# Patient Record
Sex: MALE | Race: BLACK OR AFRICAN AMERICAN | Employment: PART TIME | URBAN - METROPOLITAN AREA
[De-identification: names, ages, dates, MRNs, and addresses within clinical notes are randomized per-mention and may not be internally consistent; named-entity substitution may affect disease eponyms.]

---

## 2017-01-02 ENCOUNTER — HOSPITAL ENCOUNTER (EMERGENCY)
Age: 57
Discharge: HOME | End: 2017-01-02
Attending: EMERGENCY MEDICINE
Payer: OTHER GOVERNMENT

## 2017-01-02 ENCOUNTER — APPOINTMENT (OUTPATIENT)
Dept: CT IMAGING | Age: 57
End: 2017-01-02
Attending: EMERGENCY MEDICINE
Payer: OTHER GOVERNMENT

## 2017-01-02 VITALS
RESPIRATION RATE: 17 BRPM | DIASTOLIC BLOOD PRESSURE: 69 MMHG | SYSTOLIC BLOOD PRESSURE: 95 MMHG | HEART RATE: 67 BPM | TEMPERATURE: 97.8 F | OXYGEN SATURATION: 100 %

## 2017-01-02 DIAGNOSIS — R10.84 ABDOMINAL PAIN, GENERALIZED: ICD-10-CM

## 2017-01-02 DIAGNOSIS — K59.00 CONSTIPATION, UNSPECIFIED CONSTIPATION TYPE: ICD-10-CM

## 2017-01-02 DIAGNOSIS — K57.92 DIVERTICULITIS OF INTESTINE WITHOUT PERFORATION OR ABSCESS WITHOUT BLEEDING, UNSPECIFIED PART OF INTESTINAL TRACT: Primary | ICD-10-CM

## 2017-01-02 LAB
ALBUMIN SERPL BCP-MCNC: 3.2 G/DL (ref 3.4–5)
ALBUMIN/GLOB SERPL: 0.8 {RATIO} (ref 0.8–1.7)
ALP SERPL-CCNC: 94 U/L (ref 45–117)
ALT SERPL-CCNC: 34 U/L (ref 16–61)
ANION GAP BLD CALC-SCNC: 6 MMOL/L (ref 3–18)
AST SERPL W P-5'-P-CCNC: 27 U/L (ref 15–37)
ATRIAL RATE: 77 BPM
BASOPHILS # BLD AUTO: 0 K/UL (ref 0–0.1)
BASOPHILS # BLD: 0 % (ref 0–2)
BILIRUB DIRECT SERPL-MCNC: <0.1 MG/DL (ref 0–0.2)
BILIRUB SERPL-MCNC: 0.3 MG/DL (ref 0.2–1)
BUN SERPL-MCNC: 12 MG/DL (ref 7–18)
BUN/CREAT SERPL: 9 (ref 12–20)
CALCIUM SERPL-MCNC: 8.5 MG/DL (ref 8.5–10.1)
CALCULATED P AXIS, ECG09: 36 DEGREES
CALCULATED R AXIS, ECG10: 7 DEGREES
CALCULATED T AXIS, ECG11: 26 DEGREES
CHLORIDE SERPL-SCNC: 108 MMOL/L (ref 100–108)
CO2 SERPL-SCNC: 26 MMOL/L (ref 21–32)
CREAT SERPL-MCNC: 1.34 MG/DL (ref 0.6–1.3)
DIAGNOSIS, 93000: NORMAL
DIFFERENTIAL METHOD BLD: ABNORMAL
EOSINOPHIL # BLD: 0.1 K/UL (ref 0–0.4)
EOSINOPHIL NFR BLD: 2 % (ref 0–5)
ERYTHROCYTE [DISTWIDTH] IN BLOOD BY AUTOMATED COUNT: 15 % (ref 11.6–14.5)
GLOBULIN SER CALC-MCNC: 4 G/DL (ref 2–4)
GLUCOSE SERPL-MCNC: 129 MG/DL (ref 74–99)
HCT VFR BLD AUTO: 37.6 % (ref 36–48)
HGB BLD-MCNC: 12.5 G/DL (ref 13–16)
LACTATE BLD-SCNC: 1.4 MMOL/L (ref 0.4–2)
LIPASE SERPL-CCNC: 198 U/L (ref 73–393)
LYMPHOCYTES # BLD AUTO: 29 % (ref 21–52)
LYMPHOCYTES # BLD: 2.3 K/UL (ref 0.9–3.6)
MCH RBC QN AUTO: 26.4 PG (ref 24–34)
MCHC RBC AUTO-ENTMCNC: 33.2 G/DL (ref 31–37)
MCV RBC AUTO: 79.3 FL (ref 74–97)
MONOCYTES # BLD: 0.5 K/UL (ref 0.05–1.2)
MONOCYTES NFR BLD AUTO: 7 % (ref 3–10)
NEUTS SEG # BLD: 5 K/UL (ref 1.8–8)
NEUTS SEG NFR BLD AUTO: 62 % (ref 40–73)
P-R INTERVAL, ECG05: 164 MS
PLATELET # BLD AUTO: 185 K/UL (ref 135–420)
PMV BLD AUTO: 9.6 FL (ref 9.2–11.8)
POTASSIUM SERPL-SCNC: 4.3 MMOL/L (ref 3.5–5.5)
PROT SERPL-MCNC: 7.2 G/DL (ref 6.4–8.2)
Q-T INTERVAL, ECG07: 438 MS
QRS DURATION, ECG06: 100 MS
QTC CALCULATION (BEZET), ECG08: 495 MS
RBC # BLD AUTO: 4.74 M/UL (ref 4.7–5.5)
SODIUM SERPL-SCNC: 140 MMOL/L (ref 136–145)
VENTRICULAR RATE, ECG03: 77 BPM
WBC # BLD AUTO: 8 K/UL (ref 4.6–13.2)

## 2017-01-02 PROCEDURE — 74011250636 HC RX REV CODE- 250/636: Performed by: EMERGENCY MEDICINE

## 2017-01-02 PROCEDURE — 85025 COMPLETE CBC W/AUTO DIFF WBC: CPT | Performed by: EMERGENCY MEDICINE

## 2017-01-02 PROCEDURE — 80048 BASIC METABOLIC PNL TOTAL CA: CPT | Performed by: EMERGENCY MEDICINE

## 2017-01-02 PROCEDURE — 74177 CT ABD & PELVIS W/CONTRAST: CPT

## 2017-01-02 PROCEDURE — 93005 ELECTROCARDIOGRAM TRACING: CPT

## 2017-01-02 PROCEDURE — 87040 BLOOD CULTURE FOR BACTERIA: CPT | Performed by: EMERGENCY MEDICINE

## 2017-01-02 PROCEDURE — 83690 ASSAY OF LIPASE: CPT | Performed by: EMERGENCY MEDICINE

## 2017-01-02 PROCEDURE — 74011250637 HC RX REV CODE- 250/637: Performed by: EMERGENCY MEDICINE

## 2017-01-02 PROCEDURE — 74011636320 HC RX REV CODE- 636/320: Performed by: EMERGENCY MEDICINE

## 2017-01-02 PROCEDURE — 96374 THER/PROPH/DIAG INJ IV PUSH: CPT

## 2017-01-02 PROCEDURE — 96361 HYDRATE IV INFUSION ADD-ON: CPT

## 2017-01-02 PROCEDURE — 83605 ASSAY OF LACTIC ACID: CPT

## 2017-01-02 PROCEDURE — 80076 HEPATIC FUNCTION PANEL: CPT | Performed by: EMERGENCY MEDICINE

## 2017-01-02 PROCEDURE — 99285 EMERGENCY DEPT VISIT HI MDM: CPT

## 2017-01-02 RX ORDER — CIPROFLOXACIN 500 MG/1
500 TABLET ORAL
Status: COMPLETED | OUTPATIENT
Start: 2017-01-02 | End: 2017-01-02

## 2017-01-02 RX ORDER — DICYCLOMINE HYDROCHLORIDE 20 MG/1
20 TABLET ORAL EVERY 6 HOURS
Qty: 20 TAB | Refills: 0 | Status: SHIPPED | OUTPATIENT
Start: 2017-01-02 | End: 2017-01-07

## 2017-01-02 RX ORDER — METRONIDAZOLE 500 MG/1
500 TABLET ORAL
Status: COMPLETED | OUTPATIENT
Start: 2017-01-02 | End: 2017-01-02

## 2017-01-02 RX ORDER — METRONIDAZOLE 500 MG/1
500 TABLET ORAL 4 TIMES DAILY
Qty: 30 TAB | Refills: 0 | Status: SHIPPED | OUTPATIENT
Start: 2017-01-02 | End: 2017-01-12

## 2017-01-02 RX ORDER — HYDROMORPHONE HYDROCHLORIDE 1 MG/ML
1 INJECTION, SOLUTION INTRAMUSCULAR; INTRAVENOUS; SUBCUTANEOUS ONCE
Status: DISCONTINUED | OUTPATIENT
Start: 2017-01-02 | End: 2017-01-02 | Stop reason: HOSPADM

## 2017-01-02 RX ORDER — TRAMADOL HYDROCHLORIDE 50 MG/1
50 TABLET ORAL
Qty: 20 TAB | Refills: 0 | Status: SHIPPED | OUTPATIENT
Start: 2017-01-02

## 2017-01-02 RX ORDER — CIPROFLOXACIN 500 MG/1
500 TABLET ORAL 2 TIMES DAILY
Qty: 20 TAB | Refills: 0 | Status: SHIPPED | OUTPATIENT
Start: 2017-01-02 | End: 2017-01-12

## 2017-01-02 RX ORDER — DOCUSATE SODIUM 100 MG/1
100 CAPSULE, LIQUID FILLED ORAL 2 TIMES DAILY
Qty: 60 CAP | Refills: 0 | Status: SHIPPED | OUTPATIENT
Start: 2017-01-02 | End: 2017-04-02

## 2017-01-02 RX ADMIN — CIPROFLOXACIN HYDROCHLORIDE 500 MG: 500 TABLET, FILM COATED ORAL at 08:51

## 2017-01-02 RX ADMIN — IOPAMIDOL 100 ML: 612 INJECTION, SOLUTION INTRAVENOUS at 07:30

## 2017-01-02 RX ADMIN — SODIUM CHLORIDE 1000 ML: 900 INJECTION, SOLUTION INTRAVENOUS at 05:40

## 2017-01-02 RX ADMIN — METRONIDAZOLE 500 MG: 500 TABLET ORAL at 08:51

## 2017-01-02 NOTE — ED NOTES
Per patients wife, the patient has a history of diverticulitis and has over the past week been increasingly non compliant with the diet that he is supposed to be on.

## 2017-01-02 NOTE — ED NOTES
Bedside and Verbal shift change report given to Juany Alvarado RN (oncoming nurse) by Sonny Weiss RN (offgoing nurse). Report included the following information SBAR, ED Summary and MAR.

## 2017-01-02 NOTE — ED PROVIDER NOTES
HPI Comments: 10:29 AM Marium Shepard is a 55 y.o. male with h/o diverticulitis who presents to ED via EMS complaining of abdominal pain onset 1 hour ago. Pt states the pain woke him up from his sleep. He tried to have a bowel movement, but broke out in a sweat. He says he then lied down on the floor because it was cool the touch, and then called EMS. No other concerns or symptoms at this time. PCP: Addie Goetz MD    The history is provided by the patient. Past Medical History:   Diagnosis Date    Diabetes ([facility] [address]      Borderline-no meds.  Gastrointestinal disorder     Other ill-defined conditions      Hx of Stomach ulcer?  Psychiatric disorder      Anxiety, depression    Seizures ([facility] [address]      Last episode was 2008       Past Surgical History:   Procedure Laterality Date    Hx colonoscopy           No family history on file. Social History     Social History    Marital status:      Spouse name: N/A    Number of children: N/A    Years of education: N/A     Occupational History    Not on file. Social History Main Topics    Smoking status: Current Every Day Smoker     Packs/day: 1.00    Smokeless tobacco: Not on file    Alcohol use No    Drug use: No    Sexual activity: Not on file     Other Topics Concern    Not on file     Social History Narrative    No narrative on file         ALLERGIES: Demerol [meperidine]    Review of Systems   Constitutional: Positive for diaphoresis. Negative for activity change, appetite change and fever. HENT: Negative for congestion, dental problem, ear pain, hearing loss, nosebleeds, postnasal drip, sinus pressure, sneezing and tinnitus. Eyes: Negative for photophobia, discharge, redness and visual disturbance. Respiratory: Negative for cough, choking, shortness of breath, wheezing and stridor. Cardiovascular: Negative for chest pain, palpitations and leg swelling. Gastrointestinal: Positive for abdominal pain.  Negative for abdominal distention, anal bleeding and blood in stool. Genitourinary: Negative for decreased urine volume, difficulty urinating, discharge, dysuria, frequency, hematuria, penile swelling, scrotal swelling, testicular pain and urgency. Musculoskeletal: Negative for arthralgias, back pain, gait problem, joint swelling, myalgias and neck pain. Skin: Negative for color change and pallor. Neurological: Negative for dizziness, tremors, seizures, syncope and headaches. Hematological: Negative for adenopathy. Does not bruise/bleed easily. Psychiatric/Behavioral: Negative for agitation, behavioral problems, confusion and hallucinations. The patient is not nervous/anxious. Vitals:    01/02/17 0507   BP: 98/60   Pulse: 72   Resp: 24   Temp: 97.8 °F (36.6 °C)   SpO2: 96%            Physical Exam   Constitutional: He is oriented to person, place, and time. He appears well-developed and well-nourished. Patient is stable enough to shift back and forth in his bed. HENT:   Head: Normocephalic and atraumatic. Right Ear: External ear normal.   Left Ear: External ear normal.   Nose: Nose normal.   Mouth/Throat: Oropharynx is clear and moist.   Eyes: Conjunctivae and EOM are normal. Pupils are equal, round, and reactive to light. Right eye exhibits no discharge. No scleral icterus. Neck: Normal range of motion. Neck supple. No JVD present. No thyromegaly present. Cardiovascular: Normal rate, regular rhythm and intact distal pulses. Exam reveals no gallop and no friction rub. No murmur heard. Pulmonary/Chest: No respiratory distress. He has no wheezes. He has no rales. He exhibits no tenderness. Abdominal: Bowel sounds are decreased. Few bowel sounds. Musculoskeletal: Normal range of motion. He exhibits no edema. Lymphadenopathy:     He has no cervical adenopathy. Neurological: He is alert and oriented to person, place, and time. No cranial nerve deficit.  Coordination normal.   Skin: Skin is warm and dry. No rash noted. No erythema. Psychiatric: He has a normal mood and affect. His behavior is normal. Judgment normal.   Nursing note and vitals reviewed. [facility]  ED Course       Procedures    Vitals:  Patient Vitals for the past 12 hrs:   Temp Pulse Resp BP SpO2   01/02/17 0507 97.8 °F (36.6 °C) 72 24 98/60 96 %   96% on RA, indicating adequate oxygenation. Medications ordered:   Medications - No data to display      Lab findings:  No results found for this or any previous visit (from the past 12 hour(s)). EKG interpretation by ED Physician:       X-Ray, CT or other radiology findings or impressions:  No orders to display       Orders:  Orders Placed This Encounter    EKG, 12 LEAD, INITIAL     Standing Status:   Standing     Number of Occurrences:   1     Order Specific Question:   Reason for Exam:     Answer:   ams       Progress notes, Consult notes, or additional Procedure notes:       Disposition:  Diagnosis: No diagnosis found. Disposition:     Follow-up Information     None           Patient's Medications   Start Taking    No medications on file   Continue Taking    DIVALPROEX DR (DEPAKOTE) 500 MG TABLET    Take 500 mg by mouth two (2) times a day. Indications: EPILEPSY    HYDROCODONE-ACETAMINOPHEN 5-500 MG CAP    Take 1 Tab by mouth every six (6) hours as needed. Indications: PAIN    IBUPROFEN (MOTRIN) 800 MG TABLET    Take 800 mg by mouth as needed. Indications: PAIN    OMEPRAZOLE (PRILOSEC) 20 MG CAPSULE    Take 20 mg by mouth two (2) times a day. OXYCODONE-ACETAMINOPHEN (PERCOCET 7.5) 7.5-325 MG PER TABLET    Take 1 tablet by mouth every four (4) hours as needed for Pain. OXYCODONE-ACETAMINOPHEN (PERCOCET) 5-325 MG PER TABLET    Take 1 tablet by mouth every four (4) hours as needed for Pain. ROSUVASTATIN (CRESTOR) 40 MG TABLET    Take 20 mg by mouth nightly.  NON FORMULARY   Indications: HYPERCHOLESTEROLEMIA    TERAZOSIN (HYTRIN) 2 MG CAPSULE    Take 2 mg by mouth daily.      TERBINAFINE (LAMISIL) 1 % TOPICAL CREAM    Apply  to affected area two (2) times daily as needed. TO AFFECTED AREAS   Indications: RASH    VENLAFAXINE (EFFEXOR) 75 MG TABLET    Take 75 mg by mouth daily. These Medications have changed    No medications on file   Stop Taking    No medications on file     [id number] [facility] for and in the presence of Jayson Ahn MD (01/02/17)      Physician Attestation    I personally performed the services described in this documentation, reviewed and edited the documentation which was dictated to the scribe in my presence, and it accurately records my words and actions. Jayson Anh MD (01/02/17)      Signed by: Xavi English, 01/02/17, 5:38 AM      Pt signed out to me by Dr. Wynne Dance pending CT results with anticipated discharge to home and follow up with his doctors at the [address] [place] [address]. Reviewed results with pt. No fevers or leukocytosis, no focal abdominal tenderness, no rebound or guarding. Will treat as possible diverticulitis and refer to PMD for ongoing management and GI follow up. Pt in agreement with discharge plans.  Hollie Thompson MD

## 2017-01-02 NOTE — DISCHARGE INSTRUCTIONS
Diverticulitis: Care Instructions  Your Care Instructions    Diverticulitis occurs when pouches form in the wall of the colon and become inflamed or infected. It can be very painful. Doctors aren't sure what causes diverticulitis. There is no proof that foods such as nuts, seeds, or berries cause it or make it worse. A low-fiber diet may cause the colon to work harder to push stool forward. Pouches may form because of this extra work. It may be hard to think about healthy eating while you're in pain. But as you recover, you might think about how you can use healthy eating for overall better health. Healthy eating may help you avoid future attacks. Follow-up care is a key part of your treatment and safety. Be sure to make and go to all appointments, and call your doctor if you are having problems. It's also a good idea to know your test results and keep a list of the medicines you take. How can you care for yourself at home? · Drink plenty of fluids, enough so that your urine is light yellow or clear like water. If you have kidney, heart, or liver disease and have to limit fluids, talk with your doctor before you increase the amount of fluids you drink. · Stick to liquids or a bland diet (plain rice, bananas, dry toast or crackers, applesauce) until you are feeling better. Then you can return to regular foods and gradually increase the amount of fiber in your diet. · Use a heating pad set on low on your belly to relieve mild cramps and pain. · Get extra rest until you are feeling better. · Be safe with medicines. Read and follow all instructions on the label. ¨ If the doctor gave you a prescription medicine for pain, take it as prescribed. ¨ If you are not taking a prescription pain medicine, ask your doctor if you can take an over-the-counter medicine. · If your doctor prescribed antibiotics, take them as directed. Do not stop taking them just because you feel better.  You need to take the full course of antibiotics. To prevent future attacks of diverticulitis  · Avoid constipation:  ¨ Include fruits, vegetables, beans, and whole grains in your diet each day. These foods are high in fiber. ¨ Drink plenty of fluids, enough so that your urine is light yellow or clear like water. If you have kidney, heart, or liver disease and have to limit fluids, talk with your doctor before you increase the amount of fluids you drink. ¨ Get some exercise every day. Build up slowly to 30 to 60 minutes a day on 5 or more days of the week. ¨ Take a fiber supplement, such as Citrucel or Metamucil, every day if needed. Read and follow all instructions on the label. ¨ Schedule time each day for a bowel movement. Having a daily routine may help. Take your time and do not strain when having a bowel movement. When should you call for help? Call 911 anytime you think you may need emergency care. For example, call if:  · You passed out (lost consciousness). · You vomit blood or what looks like coffee grounds. · You pass maroon or very bloody stools. Call your doctor now or seek immediate medical care if:  · You have severe pain or swelling in your belly. · You have a new or higher fever. · You cannot keep down fluids or medicines. · You have new pain that gets worse when you move or cough. Watch closely for changes in your health, and be sure to contact your doctor if:  · The symptoms you had when you first started feeling sick come back. · You do not get better as expected. Where can you learn more? Go to http://ale-trinidad.info/. Enter H901 in the search box to learn more about \"Diverticulitis: Care Instructions. \"  Current as of: August 9, 2016  Content Version: 11.1  © 1344-8966 [employer]. Care instructions adapted under license by [employer] (which disclaims liability or warranty for this information).  If you have questions about a medical condition or this instruction, always ask your healthcare professional. [place] [address] any warranty or liability for your use of this information. Abdominal Pain: Care Instructions  Your Care Instructions    Abdominal pain has many possible causes. Some aren't serious and get better on their own in a few days. Others need more testing and treatment. If your pain continues or gets worse, you need to be rechecked and may need more tests to find out what is wrong. You may need surgery to correct the problem. Don't ignore new symptoms, such as fever, nausea and vomiting, urination problems, pain that gets worse, and dizziness. These may be signs of a more serious problem. Your doctor may have recommended a follow-up visit in the next 8 to 12 hours. If you are not getting better, you may need more tests or treatment. The doctor has checked you carefully, but problems can develop later. If you notice any problems or new symptoms, get medical treatment right away. Follow-up care is a key part of your treatment and safety. Be sure to make and go to all appointments, and call your doctor if you are having problems. It's also a good idea to know your test results and keep a list of the medicines you take. How can you care for yourself at home? · Rest until you feel better. · To prevent dehydration, drink plenty of fluids, enough so that your urine is light yellow or clear like water. Choose water and other caffeine-free clear liquids until you feel better. If you have kidney, heart, or liver disease and have to limit fluids, talk with your doctor before you increase the amount of fluids you drink. · If your stomach is upset, eat mild foods, such as rice, dry toast or crackers, bananas, and applesauce. Try eating several small meals instead of two or three large ones. · Wait until 48 hours after all symptoms have gone away before you have spicy foods, alcohol, and drinks that contain caffeine.   · Do not eat foods that are high in fat. · Avoid anti-inflammatory medicines such as aspirin, ibuprofen (Advil, Motrin), and naproxen (Aleve). These can cause stomach upset. Talk to your doctor if you take daily aspirin for another health problem. When should you call for help? Call 911 anytime you think you may need emergency care. For example, call if:  · You passed out (lost consciousness). · You pass maroon or very bloody stools. · You vomit blood or what looks like coffee grounds. · You have new, severe belly pain. Call your doctor now or seek immediate medical care if:  · Your pain gets worse, especially if it becomes focused in one area of your belly. · You have a new or higher fever. · Your stools are black and look like tar, or they have streaks of blood. · You have unexpected vaginal bleeding. · You have symptoms of a urinary tract infection. These may include:  ¨ Pain when you urinate. ¨ Urinating more often than usual.  ¨ Blood in your urine. · You are dizzy or lightheaded, or you feel like you may faint. Watch closely for changes in your health, and be sure to contact your doctor if:  · You are not getting better after 1 day (24 hours). Where can you learn more? Go to http://ale-trinidad.info/. Enter E917 in the search box to learn more about \"Abdominal Pain: Care Instructions. \"  Current as of: May 27, 2016  Content Version: 11.1  © 4078-8586 [employer]. Care instructions adapted under license by [employer] (which disclaims liability or warranty for this information). If you have questions about a medical condition or this instruction, always ask your healthcare professional. [place] [address] any warranty or liability for your use of this information. Constipation: Care Instructions  Your Care Instructions  Constipation means that you have a hard time passing stools (bowel movements).  People pass stools from 3 times a day to once every 3 days. What is normal for you may be different. Constipation may occur with pain in the rectum and cramping. The pain may get worse when you try to pass stools. Sometimes there are small amounts of bright red blood on toilet paper or the surface of stools. This is because of enlarged veins near the rectum (hemorrhoids). A few changes in your diet and lifestyle may help you avoid ongoing constipation. Your doctor may also prescribe medicine to help loosen your stool. Some medicines can cause constipation. These include pain medicines and antidepressants. Tell your doctor about all the medicines you take. Your doctor may want to make a medicine change to ease your symptoms. Follow-up care is a key part of your treatment and safety. Be sure to make and go to all appointments, and call your doctor if you are having problems. It's also a good idea to know your test results and keep a list of the medicines you take. How can you care for yourself at home? · Drink plenty of fluids, enough so that your urine is light yellow or clear like water. If you have kidney, heart, or liver disease and have to limit fluids, talk with your doctor before you increase the amount of fluids you drink. · Include high-fiber foods in your diet each day. These include fruits, vegetables, beans, and whole grains. · Get at least 30 minutes of exercise on most days of the week. Walking is a good choice. You also may want to do other activities, such as running, swimming, cycling, or playing tennis or team sports. · Take a fiber supplement, such as Citrucel or Metamucil, every day. Read and follow all instructions on the label. · Schedule time each day for a bowel movement. A daily routine may help. Take your time having your bowel movement. · Support your feet with a small step stool when you sit on the toilet. This helps flex your hips and places your pelvis in a squatting position.   · Your doctor may recommend an over-the-counter laxative to relieve your constipation. Examples are Milk of Magnesia and MiraLax. Read and follow all instructions on the label. Do not use laxatives on a long-term basis. When should you call for help? Call your doctor now or seek immediate medical care if:  · You have new or worse belly pain. · You have new or worse nausea or vomiting. · You have blood in your stools. Watch closely for changes in your health, and be sure to contact your doctor if:  · Your constipation is getting worse. · You do not get better as expected. Where can you learn more? Go to http://ale-trinidad.info/. Enter 21 [id number] in the search box to learn more about \"Constipation: Care Instructions. \"  Current as of: May 27, 2016  Content Version: 11.1  © 4270-6662 [employer]. Care instructions adapted under license by [employer] (which disclaims liability or warranty for this information). If you have questions about a medical condition or this instruction, always ask your healthcare professional. [place] [address] any warranty or liability for your use of this information.

## 2017-01-02 NOTE — ED NOTES
I have reviewed discharge instructions with the patient. The patient verbalized understanding. Paper discharge instructions signed and placed in scan bin.

## 2017-01-02 NOTE — ED NOTES
Patient assisted to bedside commode. Patient reports that he is not having abdominal pain at this time and is feeling much better since he has vomited and had a bowel movement.

## 2017-01-02 NOTE — ED TRIAGE NOTES
Patient brought into the ED by EMTs complaining of abdominal pain. Patient reports that he woke up in the middle of the night and his stomach was bother him. Patient states that he got hot and sweaty and felt like he was going to get sick. Patient has had one episode of a pinkish brown color upon entering the room and reports that this is the first time he has actually gotten sick.  Patient at 92% on room air when EMTs found him and was placed on 2L NC.

## 2017-01-08 LAB
BACTERIA SPEC CULT: NORMAL
BACTERIA SPEC CULT: NORMAL
SERVICE CMNT-IMP: NORMAL
SERVICE CMNT-IMP: NORMAL

## 2022-03-23 ENCOUNTER — HOSPITAL ENCOUNTER (EMERGENCY)
Age: 62
Discharge: HOME | End: 2022-03-24
Attending: EMERGENCY MEDICINE
Payer: OTHER GOVERNMENT

## 2022-03-23 DIAGNOSIS — R42 LIGHTHEADED: ICD-10-CM

## 2022-03-23 DIAGNOSIS — R07.9 CHEST PAIN, UNSPECIFIED TYPE: Primary | ICD-10-CM

## 2022-03-23 PROCEDURE — 99285 EMERGENCY DEPT VISIT HI MDM: CPT

## 2022-03-24 ENCOUNTER — APPOINTMENT (OUTPATIENT)
Dept: GENERAL RADIOLOGY | Age: 62
End: 2022-03-24
Attending: EMERGENCY MEDICINE
Payer: OTHER GOVERNMENT

## 2022-03-24 VITALS
SYSTOLIC BLOOD PRESSURE: 99 MMHG | WEIGHT: 264 LBS | BODY MASS INDEX: 43.99 KG/M2 | HEART RATE: 66 BPM | HEIGHT: 65 IN | TEMPERATURE: 98.2 F | DIASTOLIC BLOOD PRESSURE: 69 MMHG | OXYGEN SATURATION: 96 % | RESPIRATION RATE: 21 BRPM

## 2022-03-24 LAB
ALBUMIN SERPL-MCNC: 3.2 G/DL (ref 3.4–5)
ALBUMIN/GLOB SERPL: 0.9 {RATIO} (ref 0.8–1.7)
ALP SERPL-CCNC: 84 U/L (ref 45–117)
ALT SERPL-CCNC: 34 U/L (ref 16–61)
ANION GAP SERPL CALC-SCNC: 5 MMOL/L (ref 3–18)
AST SERPL-CCNC: 22 U/L (ref 10–38)
ATRIAL RATE: 66 BPM
BASOPHILS # BLD: 0 K/UL (ref 0–0.1)
BASOPHILS NFR BLD: 0 % (ref 0–2)
BILIRUB SERPL-MCNC: 0.3 MG/DL (ref 0.2–1)
BUN SERPL-MCNC: 12 MG/DL (ref 7–18)
BUN/CREAT SERPL: 9 (ref 12–20)
CALCIUM SERPL-MCNC: 8.6 MG/DL (ref 8.5–10.1)
CALCULATED P AXIS, ECG09: 31 DEGREES
CALCULATED R AXIS, ECG10: 0 DEGREES
CALCULATED T AXIS, ECG11: 29 DEGREES
CHLORIDE SERPL-SCNC: 109 MMOL/L (ref 100–111)
CO2 SERPL-SCNC: 27 MMOL/L (ref 21–32)
CREAT SERPL-MCNC: 1.35 MG/DL (ref 0.6–1.3)
DIAGNOSIS, 93000: NORMAL
DIFFERENTIAL METHOD BLD: ABNORMAL
EOSINOPHIL # BLD: 0.1 K/UL (ref 0–0.4)
EOSINOPHIL NFR BLD: 1 % (ref 0–5)
ERYTHROCYTE [DISTWIDTH] IN BLOOD BY AUTOMATED COUNT: 16.2 % (ref 11.6–14.5)
GLOBULIN SER CALC-MCNC: 3.7 G/DL (ref 2–4)
GLUCOSE SERPL-MCNC: 100 MG/DL (ref 74–99)
HCT VFR BLD AUTO: 35.6 % (ref 36–48)
HGB BLD-MCNC: 11.3 G/DL (ref 13–16)
IMM GRANULOCYTES # BLD AUTO: 0 K/UL (ref 0–0.04)
IMM GRANULOCYTES NFR BLD AUTO: 0 % (ref 0–0.5)
LYMPHOCYTES # BLD: 1.9 K/UL (ref 0.9–3.6)
LYMPHOCYTES NFR BLD: 30 % (ref 21–52)
MCH RBC QN AUTO: 25.1 PG (ref 24–34)
MCHC RBC AUTO-ENTMCNC: 31.7 G/DL (ref 31–37)
MCV RBC AUTO: 78.9 FL (ref 78–100)
MONOCYTES # BLD: 0.4 K/UL (ref 0.05–1.2)
MONOCYTES NFR BLD: 7 % (ref 3–10)
NEUTS SEG # BLD: 3.9 K/UL (ref 1.8–8)
NEUTS SEG NFR BLD: 62 % (ref 40–73)
NRBC # BLD: 0 K/UL (ref 0–0.01)
NRBC BLD-RTO: 0 PER 100 WBC
P-R INTERVAL, ECG05: 146 MS
PLATELET # BLD AUTO: 218 K/UL (ref 135–420)
PMV BLD AUTO: 9.8 FL (ref 9.2–11.8)
POTASSIUM SERPL-SCNC: 4.7 MMOL/L (ref 3.5–5.5)
PROT SERPL-MCNC: 6.9 G/DL (ref 6.4–8.2)
Q-T INTERVAL, ECG07: 436 MS
QRS DURATION, ECG06: 88 MS
QTC CALCULATION (BEZET), ECG08: 457 MS
RBC # BLD AUTO: 4.51 M/UL (ref 4.35–5.65)
SODIUM SERPL-SCNC: 141 MMOL/L (ref 136–145)
TROPONIN-HIGH SENSITIVITY: <3 NG/L (ref 0–78)
TROPONIN-HIGH SENSITIVITY: <3 NG/L (ref 0–78)
VENTRICULAR RATE, ECG03: 66 BPM
WBC # BLD AUTO: 6.3 K/UL (ref 4.6–13.2)

## 2022-03-24 PROCEDURE — 85025 COMPLETE CBC W/AUTO DIFF WBC: CPT

## 2022-03-24 PROCEDURE — 84484 ASSAY OF TROPONIN QUANT: CPT

## 2022-03-24 PROCEDURE — 71045 X-RAY EXAM CHEST 1 VIEW: CPT

## 2022-03-24 PROCEDURE — 80053 COMPREHEN METABOLIC PANEL: CPT

## 2022-03-24 PROCEDURE — 93005 ELECTROCARDIOGRAM TRACING: CPT

## 2022-03-24 NOTE — ED TRIAGE NOTES
Patient presents to the ED via EMS from home for evaluation of chest pain. Per medic, \"Patient had been up using the restroom when he became a little light headed and his chest started hurting. He had been hypertensive initially for us. We administered 1 nitro ODT and established IV access. \"     Patient janae any difficulty breathing or vision changes.

## 2022-03-24 NOTE — ED NOTES
Patient in bed resting with lights dimmed, in stable condition and wife at bedside. Lab wok currently pending.

## 2022-03-24 NOTE — ED PROVIDER NOTES
EMERGENCY DEPARTMENT HISTORY AND PHYSICAL EXAM  This was created with voice recognition software and transcription errors may be present. 12:08 AM  Date: 3/23/2022  Patient Name: Domenic Maravilla    History of Presenting Illness     Chief Complaint:    History Provided By:     HPI: Domenic Maravilla is a 65 y.o. male past medical history of diabetes anxiety seizures MI who presents with chest tightness. Patient states he was at home earlier this evening and 3 separate times about 20 minutes each he felt chest tightness associated with diaphoresis and nausea he said he felt like this was like his last heart attack. It occurred when he walked around her set up but laying down made it better. One point he had to have a bowel movement as well. He also felt lightheaded like he might pass out. Currently pain-free    PCP: Addie Goetz MD      Past History     Past Medical History:  Past Medical History:   Diagnosis Date    Diabetes ([facility] [address]     Borderline-no meds.  Gastrointestinal disorder     Other ill-defined conditions     Hx of Stomach ulcer?  Psychiatric disorder     Anxiety, depression    Seizures ([facility] [address]     Last episode was 2008       Past Surgical History:  Past Surgical History:   Procedure Laterality Date    HX COLONOSCOPY         Family History:  No family history on file. Social History:  Social History     Tobacco Use    Smoking status: Current Every Day Smoker     Packs/day: 1.00    Smokeless tobacco: Not on file   Substance Use Topics    Alcohol use: No    Drug use: No       Allergies: Allergies   Allergen Reactions    Demerol [Meperidine] Seizures       Review of Systems     Review of Systems   Constitutional: Negative for activity change. HENT: Negative for congestion. All other systems reviewed and are negative. 10 point review of systems otherwise negative unless noted in HPI. Physical Exam       Physical Exam  Constitutional:       Appearance: He is well-developed.    HENT: Head: Normocephalic and atraumatic. Eyes:      Pupils: Pupils are equal, round, and reactive to light. Cardiovascular:      Rate and Rhythm: Normal rate and regular rhythm. Heart sounds: Normal heart sounds. No murmur heard. No friction rub. Pulmonary:      Effort: Pulmonary effort is normal. No respiratory distress. Breath sounds: Normal breath sounds. No wheezing. Abdominal:      General: There is no distension. Palpations: Abdomen is soft. Tenderness: There is no abdominal tenderness. There is no guarding or rebound. Musculoskeletal:         General: Normal range of motion. Cervical back: Normal range of motion and neck supple. Skin:     General: Skin is warm and dry. Neurological:      Mental Status: He is alert and oriented to person, place, and time. Psychiatric:         Behavior: Behavior normal.         Thought Content: Thought content normal.         Diagnostic Study Results     Vital Signs  EKG: EKG shows sinus at 66 normal axis normal intervals no ST elevation or depression no hypertrophy  Labs:   Imaging: napd     Medical Decision Making     ED Course: Progress Notes, Reevaluation, and Consults:    I will be the provider of record for this patient. Provider Notes (Medical Decision Making): Patient presents with 3 episodes of chest tightness and diaphoresis. Currently pain-free without diaphoresis. Also associated lightheadedness check troponin x2 EKG and x-ray      Troponin negative x2 patient ambulated without difficulty will discharge for outpatient follow-up    Diagnosis     Clinical Impression: No diagnosis found. Disposition:        Patient's Medications   Start Taking    No medications on file   Continue Taking    DIVALPROEX DR (DEPAKOTE) 500 MG TABLET    Take 500 mg by mouth two (2) times a day. Indications: EPILEPSY    HYDROCODONE-ACETAMINOPHEN 5-500 MG CAP    Take 1 Tab by mouth every six (6) hours as needed.     Indications: PAIN IBUPROFEN (MOTRIN) 800 MG TABLET    Take 800 mg by mouth as needed. Indications: PAIN    OMEPRAZOLE (PRILOSEC) 20 MG CAPSULE    Take 20 mg by mouth two (2) times a day. OXYCODONE-ACETAMINOPHEN (PERCOCET 7.5) 7.5-325 MG PER TABLET    Take 1 tablet by mouth every four (4) hours as needed for Pain. OXYCODONE-ACETAMINOPHEN (PERCOCET) 5-325 MG PER TABLET    Take 1 tablet by mouth every four (4) hours as needed for Pain. ROSUVASTATIN (CRESTOR) 40 MG TABLET    Take 20 mg by mouth nightly. NON FORMULARY   Indications: HYPERCHOLESTEROLEMIA    TERAZOSIN (HYTRIN) 2 MG CAPSULE    Take 2 mg by mouth daily. TERBINAFINE (LAMISIL) 1 % TOPICAL CREAM    Apply  to affected area two (2) times daily as needed. TO AFFECTED AREAS   Indications: RASH    TRAMADOL (ULTRAM) 50 MG TABLET    Take 1 Tab by mouth every six (6) hours as needed for Pain. Max Daily Amount: 200 mg. VENLAFAXINE (EFFEXOR) 75 MG TABLET    Take 75 mg by mouth daily.      These Medications have changed    No medications on file   Stop Taking    No medications on file

## 2024-10-02 ENCOUNTER — APPOINTMENT (OUTPATIENT)
Facility: HOSPITAL | Age: 64
DRG: 683 | End: 2024-10-02
Payer: OTHER GOVERNMENT

## 2024-10-02 ENCOUNTER — HOSPITAL ENCOUNTER (INPATIENT)
Facility: HOSPITAL | Age: 64
LOS: 1 days | Discharge: LEFT BEFORE DISCHARGE | DRG: 683 | End: 2024-10-02
Attending: EMERGENCY MEDICINE | Admitting: INTERNAL MEDICINE
Payer: OTHER GOVERNMENT

## 2024-10-02 ENCOUNTER — APPOINTMENT (OUTPATIENT)
Facility: HOSPITAL | Age: 64
DRG: 683 | End: 2024-10-02
Attending: INTERNAL MEDICINE
Payer: OTHER GOVERNMENT

## 2024-10-02 VITALS
HEART RATE: 79 BPM | BODY MASS INDEX: 43.39 KG/M2 | WEIGHT: 270 LBS | TEMPERATURE: 97.7 F | HEIGHT: 66 IN | SYSTOLIC BLOOD PRESSURE: 102 MMHG | DIASTOLIC BLOOD PRESSURE: 63 MMHG | OXYGEN SATURATION: 97 % | RESPIRATION RATE: 18 BRPM

## 2024-10-02 DIAGNOSIS — K57.92 DIVERTICULITIS: ICD-10-CM

## 2024-10-02 DIAGNOSIS — N17.9 ACUTE RENAL FAILURE, UNSPECIFIED ACUTE RENAL FAILURE TYPE (HCC): Primary | ICD-10-CM

## 2024-10-02 DIAGNOSIS — R07.9 ACUTE CHEST PAIN: ICD-10-CM

## 2024-10-02 DIAGNOSIS — I95.9 HYPOTENSION, UNSPECIFIED HYPOTENSION TYPE: ICD-10-CM

## 2024-10-02 LAB
ALBUMIN SERPL-MCNC: 2.9 G/DL (ref 3.4–5)
ALBUMIN/GLOB SERPL: 0.7 (ref 0.8–1.7)
ALP SERPL-CCNC: 91 U/L (ref 45–117)
ALT SERPL-CCNC: 29 U/L (ref 16–61)
ANION GAP SERPL CALC-SCNC: 9 MMOL/L (ref 3–18)
APPEARANCE UR: CLEAR
AST SERPL-CCNC: 26 U/L (ref 10–38)
BACTERIA URNS QL MICRO: ABNORMAL /HPF
BASOPHILS # BLD: 0 K/UL (ref 0–0.1)
BASOPHILS NFR BLD: 0 % (ref 0–2)
BILIRUB SERPL-MCNC: 0.4 MG/DL (ref 0.2–1)
BILIRUB UR QL: NEGATIVE
BUN SERPL-MCNC: 43 MG/DL (ref 7–18)
BUN/CREAT SERPL: 12 (ref 12–20)
CALCIUM SERPL-MCNC: 9.5 MG/DL (ref 8.5–10.1)
CHLORIDE SERPL-SCNC: 108 MMOL/L (ref 100–111)
CO2 SERPL-SCNC: 22 MMOL/L (ref 21–32)
COLOR UR: YELLOW
CREAT SERPL-MCNC: 3.57 MG/DL (ref 0.6–1.3)
CREAT UR-MCNC: 237 MG/DL (ref 30–125)
CREAT UR-MCNC: 242 MG/DL (ref 30–125)
CREAT UR-MCNC: 246 MG/DL (ref 30–125)
CRP SERPL-MCNC: 2.3 MG/DL (ref 0–0.3)
DIFFERENTIAL METHOD BLD: ABNORMAL
ECHO BSA: 2.39 M2
EKG ATRIAL RATE: 84 BPM
EKG ATRIAL RATE: 88 BPM
EKG DIAGNOSIS: NORMAL
EKG DIAGNOSIS: NORMAL
EKG P AXIS: 52 DEGREES
EKG P AXIS: 58 DEGREES
EKG P-R INTERVAL: 156 MS
EKG P-R INTERVAL: 166 MS
EKG Q-T INTERVAL: 406 MS
EKG Q-T INTERVAL: 410 MS
EKG QRS DURATION: 88 MS
EKG QRS DURATION: 94 MS
EKG QTC CALCULATION (BAZETT): 479 MS
EKG QTC CALCULATION (BAZETT): 496 MS
EKG R AXIS: 19 DEGREES
EKG R AXIS: 27 DEGREES
EKG T AXIS: 56 DEGREES
EKG T AXIS: 57 DEGREES
EKG VENTRICULAR RATE: 84 BPM
EKG VENTRICULAR RATE: 88 BPM
EOSINOPHIL # BLD: 0.2 K/UL (ref 0–0.4)
EOSINOPHIL NFR BLD: 3 % (ref 0–5)
EPITH CASTS URNS QL MICRO: ABNORMAL /LPF (ref 0–5)
ERYTHROCYTE [DISTWIDTH] IN BLOOD BY AUTOMATED COUNT: 16.7 % (ref 11.6–14.5)
ERYTHROCYTE [SEDIMENTATION RATE] IN BLOOD: 128 MM/HR (ref 0–20)
GLOBULIN SER CALC-MCNC: 4.2 G/DL (ref 2–4)
GLUCOSE SERPL-MCNC: 150 MG/DL (ref 74–99)
GLUCOSE UR STRIP.AUTO-MCNC: NEGATIVE MG/DL
HCT VFR BLD AUTO: 27.4 % (ref 36–48)
HGB BLD-MCNC: 8.9 G/DL (ref 13–16)
HGB UR QL STRIP: NEGATIVE
HYALINE CASTS URNS QL MICRO: ABNORMAL /LPF (ref 0–2)
IMM GRANULOCYTES # BLD AUTO: 0 K/UL (ref 0–0.04)
IMM GRANULOCYTES NFR BLD AUTO: 0 % (ref 0–0.5)
KETONES UR QL STRIP.AUTO: NEGATIVE MG/DL
LACTATE SERPL-SCNC: 1 MMOL/L (ref 0.4–2)
LEUKOCYTE ESTERASE UR QL STRIP.AUTO: ABNORMAL
LYMPHOCYTES # BLD: 2.5 K/UL (ref 0.9–3.6)
LYMPHOCYTES NFR BLD: 30 % (ref 21–52)
MAGNESIUM SERPL-MCNC: 1.6 MG/DL (ref 1.6–2.6)
MCH RBC QN AUTO: 23.7 PG (ref 24–34)
MCHC RBC AUTO-ENTMCNC: 32.5 G/DL (ref 31–37)
MCV RBC AUTO: 73.1 FL (ref 78–100)
MICROALBUMIN UR-MCNC: 4.28 MG/DL (ref 0–3)
MICROALBUMIN/CREAT UR-RTO: 18 MG/G (ref 0–30)
MONOCYTES # BLD: 0.7 K/UL (ref 0.05–1.2)
MONOCYTES NFR BLD: 8 % (ref 3–10)
NEUTS SEG # BLD: 4.9 K/UL (ref 1.8–8)
NEUTS SEG NFR BLD: 58 % (ref 40–73)
NITRITE UR QL STRIP.AUTO: NEGATIVE
NRBC # BLD: 0 K/UL (ref 0–0.01)
NRBC BLD-RTO: 0 PER 100 WBC
NT PRO BNP: 387 PG/ML (ref 0–900)
OSMOLALITY SERPL: 303 MOSM/KG H2O (ref 280–301)
OSMOLALITY UR: 406 MOSM/KG H2O
PH UR STRIP: 5.5 (ref 5–8)
PLATELET # BLD AUTO: 366 K/UL (ref 135–420)
PMV BLD AUTO: 8.5 FL (ref 9.2–11.8)
POTASSIUM SERPL-SCNC: 3.4 MMOL/L (ref 3.5–5.5)
PROT SERPL-MCNC: 7.1 G/DL (ref 6.4–8.2)
PROT UR STRIP-MCNC: 30 MG/DL
PROT UR-MCNC: 93 MG/DL
PROT/CREAT UR-RTO: 0.4
RBC # BLD AUTO: 3.75 M/UL (ref 4.35–5.65)
RBC #/AREA URNS HPF: ABNORMAL /HPF (ref 0–5)
SODIUM SERPL-SCNC: 139 MMOL/L (ref 136–145)
SODIUM UR-SCNC: <5 MMOL/L (ref 20–110)
SP GR UR REFRACTOMETRY: 1.02 (ref 1–1.03)
TROPONIN I SERPL HS-MCNC: 6 NG/L (ref 0–78)
TROPONIN I SERPL HS-MCNC: 8 NG/L (ref 0–78)
TROPONIN I SERPL HS-MCNC: 8 NG/L (ref 0–78)
UROBILINOGEN UR QL STRIP.AUTO: 0.2 EU/DL (ref 0.2–1)
VAS AORTA DIST AP: 4.94 CM
VAS AORTA DIST PSV: 11.2 CM/S
VAS AORTA DIST TR: 4.88 CM
VAS AORTA MID AP: 4.93 CM
VAS AORTA MID PSV: 13.9 CM/S
VAS AORTA MID TRANS: 4.56 CM
VAS AORTA PROX AP: 4.71 CM
VAS AORTA PROX PSV: 14.3 CM/S
VAS AORTA PROX TR: 5.06 CM
VAS L RENAL ACC DIST RI: 0.83
VAS L RENAL ACC MID RI: 0.48
VAS L RENAL ACC PROX RI: 0.52
VAS L RENAL ORIG RI: 0.65
VAS LEFT ACCESSORY RENAL DIST EDV: 1.5 CM/S
VAS LEFT ACCESSORY RENAL DIST PSV: 9 CM/S
VAS LEFT ACCESSORY RENAL DIST RAR: 0.65
VAS LEFT ACCESSORY RENAL MID EDV: 6.1 CM/S
VAS LEFT ACCESSORY RENAL MID PSV: 11.8 CM/S
VAS LEFT ACCESSORY RENAL MID RAR: 0.85
VAS LEFT ACCESSORY RENAL PROX EDV: 5.1 CM/S
VAS LEFT ACCESSORY RENAL PROX PSV: 10.7 CM/S
VAS LEFT ACCESSORY RENAL PROX RAR: 0.77
VAS LEFT ARCUATE RENAL ARTERY EDV: 1.5 CM/S
VAS LEFT ARCUATE RENAL ARTERY EDV: 5.1 CM/S
VAS LEFT ARCUATE RENAL ARTERY EDV: 6.1 CM/S
VAS LEFT ARCUATE RENAL ARTERY PSV: 10.8 CM/S
VAS LEFT ARCUATE RENAL ARTERY PSV: 11.8 CM/S
VAS LEFT ARCUATE RENAL ARTERY PSV: 9 CM/S
VAS LEFT INTERLOBAR EDV: 10.1 CM/S
VAS LEFT INTERLOBAR PSV: 29.3 CM/S
VAS LEFT INTERLOBAR RI: 0.66
VAS LEFT KIDNEY LENGTH: 11.67 CM
VAS LEFT KIDNEY WIDTH: 6.08 CM
VAS LEFT RENAL ARCUATE ACCEL TIME: 0.03 S
VAS LEFT RENAL DIST EDV: 18.2 CM/S
VAS LEFT RENAL DIST PSV: 52.3 CM/S
VAS LEFT RENAL DIST RAR: 3.76
VAS LEFT RENAL DIST RI: 0.65
VAS LEFT RENAL MID EDV: 11 CM/S
VAS LEFT RENAL MID PSV: 27.1 CM/S
VAS LEFT RENAL MID RAR: 1.95
VAS LEFT RENAL MID RI: 0.59
VAS LEFT RENAL ORIGIN EDV: 10.3 CM/S
VAS LEFT RENAL ORIGIN PSV: 29.4 CM/S
VAS LEFT RENAL ORIGIN RAR: 2.12
VAS LEFT RENAL PROX EDV: 14.5 CM/S
VAS LEFT RENAL PROX PSV: 49.1 CM/S
VAS LEFT RENAL PROX RAR: 3.53
VAS LEFT RENAL PROX RI: 0.7
VAS LEFT RENAL RAR: 3.76
VAS R RENAL ACC DIST RI: 0.51
VAS R RENAL ACC MID RI: 0.9
VAS R RENAL ACC PROX RI: 0.96
VAS RIGHT ACCESSORY RENAL DIST EDV: 5.1 CM/S
VAS RIGHT ACCESSORY RENAL DIST PSV: 10.4 CM/S
VAS RIGHT ACCESSORY RENAL DIST RAR: 0.75
VAS RIGHT ACCESSORY RENAL MID EDV: 0.8 CM/S
VAS RIGHT ACCESSORY RENAL MID PSV: 7.9 CM/S
VAS RIGHT ACCESSORY RENAL MID RAR: 0.57
VAS RIGHT ACCESSORY RENAL PROX EDV: 0.4 CM/S
VAS RIGHT ACCESSORY RENAL PROX PSV: 9.7 CM/S
VAS RIGHT ACCESSORY RENAL PROX RAR: 0.7
VAS RIGHT ARCUATE RENAL ARTERY EDV: 0.4 CM/S
VAS RIGHT ARCUATE RENAL ARTERY EDV: 0.8 CM/S
VAS RIGHT ARCUATE RENAL ARTERY EDV: 5.1 CM/S
VAS RIGHT ARCUATE RENAL ARTERY PSV: 10.4 CM/S
VAS RIGHT ARCUATE RENAL ARTERY PSV: 7.9 CM/S
VAS RIGHT ARCUATE RENAL ARTERY PSV: 9.7 CM/S
VAS RIGHT INTERLOBAR EDV: 14.1 CM/S
VAS RIGHT INTERLOBAR PSV: 30.7 CM/S
VAS RIGHT INTERLOBAR RI: 0.54
VAS RIGHT KIDNEY LENGTH: 9.42 CM
VAS RIGHT KIDNEY WIDTH: 5.18 CM
VAS RIGHT RENAL ARCUATE ACCEL TIME: 0.06 S
VAS RIGHT RENAL DIST EDV: 20 CM/S
VAS RIGHT RENAL DIST PSV: 44.2 CM/S
VAS RIGHT RENAL DIST RAR: 3.18
VAS RIGHT RENAL DIST RI: 0.55
VAS RIGHT RENAL MID EDV: 12.4 CM/S
VAS RIGHT RENAL MID PSV: 27.2 CM/S
VAS RIGHT RENAL MID RAR: 1.96
VAS RIGHT RENAL MID RI: 0.54
VAS RIGHT RENAL PROX EDV: 11.5 CM/S
VAS RIGHT RENAL PROX PSV: 28.1 CM/S
VAS RIGHT RENAL PROX RAR: 2.02
VAS RIGHT RENAL PROX RI: 0.59
VAS RIGHT RENAL RAR: 3.18
WBC # BLD AUTO: 8.4 K/UL (ref 4.6–13.2)
WBC URNS QL MICRO: ABNORMAL /HPF (ref 0–4)

## 2024-10-02 PROCEDURE — 71250 CT THORAX DX C-: CPT

## 2024-10-02 PROCEDURE — 83605 ASSAY OF LACTIC ACID: CPT

## 2024-10-02 PROCEDURE — 82570 ASSAY OF URINE CREATININE: CPT

## 2024-10-02 PROCEDURE — 86160 COMPLEMENT ANTIGEN: CPT

## 2024-10-02 PROCEDURE — 84484 ASSAY OF TROPONIN QUANT: CPT

## 2024-10-02 PROCEDURE — 81001 URINALYSIS AUTO W/SCOPE: CPT

## 2024-10-02 PROCEDURE — 80053 COMPREHEN METABOLIC PANEL: CPT

## 2024-10-02 PROCEDURE — 83930 ASSAY OF BLOOD OSMOLALITY: CPT

## 2024-10-02 PROCEDURE — 93005 ELECTROCARDIOGRAM TRACING: CPT | Performed by: EMERGENCY MEDICINE

## 2024-10-02 PROCEDURE — 83935 ASSAY OF URINE OSMOLALITY: CPT

## 2024-10-02 PROCEDURE — 83735 ASSAY OF MAGNESIUM: CPT

## 2024-10-02 PROCEDURE — 82043 UR ALBUMIN QUANTITATIVE: CPT

## 2024-10-02 PROCEDURE — 93975 VASCULAR STUDY: CPT

## 2024-10-02 PROCEDURE — 99285 EMERGENCY DEPT VISIT HI MDM: CPT

## 2024-10-02 PROCEDURE — 2580000003 HC RX 258: Performed by: EMERGENCY MEDICINE

## 2024-10-02 PROCEDURE — 76770 US EXAM ABDO BACK WALL COMP: CPT

## 2024-10-02 PROCEDURE — 1100000000 HC RM PRIVATE

## 2024-10-02 PROCEDURE — 84300 ASSAY OF URINE SODIUM: CPT

## 2024-10-02 PROCEDURE — 94761 N-INVAS EAR/PLS OXIMETRY MLT: CPT

## 2024-10-02 PROCEDURE — 96361 HYDRATE IV INFUSION ADD-ON: CPT

## 2024-10-02 PROCEDURE — 96360 HYDRATION IV INFUSION INIT: CPT

## 2024-10-02 PROCEDURE — 83880 ASSAY OF NATRIURETIC PEPTIDE: CPT

## 2024-10-02 PROCEDURE — 84156 ASSAY OF PROTEIN URINE: CPT

## 2024-10-02 PROCEDURE — 71045 X-RAY EXAM CHEST 1 VIEW: CPT

## 2024-10-02 PROCEDURE — 86140 C-REACTIVE PROTEIN: CPT

## 2024-10-02 PROCEDURE — 85652 RBC SED RATE AUTOMATED: CPT

## 2024-10-02 PROCEDURE — 85025 COMPLETE CBC W/AUTO DIFF WBC: CPT

## 2024-10-02 RX ORDER — CLOPIDOGREL BISULFATE 75 MG/1
75 TABLET ORAL DAILY
COMMUNITY
Start: 2024-08-11

## 2024-10-02 RX ORDER — SODIUM CHLORIDE, SODIUM LACTATE, POTASSIUM CHLORIDE, AND CALCIUM CHLORIDE .6; .31; .03; .02 G/100ML; G/100ML; G/100ML; G/100ML
500 INJECTION, SOLUTION INTRAVENOUS ONCE
Status: DISCONTINUED | OUTPATIENT
Start: 2024-10-02 | End: 2024-10-02 | Stop reason: HOSPADM

## 2024-10-02 RX ORDER — 0.9 % SODIUM CHLORIDE 0.9 %
1000 INTRAVENOUS SOLUTION INTRAVENOUS ONCE
Status: COMPLETED | OUTPATIENT
Start: 2024-10-02 | End: 2024-10-02

## 2024-10-02 RX ADMIN — SODIUM CHLORIDE 1000 ML: 9 INJECTION, SOLUTION INTRAVENOUS at 06:59

## 2024-10-02 NOTE — PROGRESS NOTES
Abn dopplers dw priyanka  Decreased blood supply to renal artery   Increased AAA to 5.1  Staccato blood supply to mid to distal aorta suggestive of thrombus    Needs to see vascular ? Transfer instead of admission based on available services     Comminucated to the ED MD     
Assumed care of patient at this time. Patient resting on stretcher and connected to cardiac monitor with NAD noted nor voiced. Patient has visitor at bedside. Pt at this time being prepared to transport KRYSTAL for a procedure. Bedside shift report received care ongoing   
Patient provided with ice water at this time.   
  No intake/output data recorded.  No intake/output data recorded.    HENT:      Head: Normocephalic.      Nose: Nose normal.      Mouth/Throat:      Mouth: Mucous membranes are moist.   Eyes:      Pupils: Pupils are equal, round, and reactive to light.   Cardiovascular:      Rate and Rhythm: Normal rate and regular rhythm.   Pulmonary:      Effort: Pulmonary effort is normal.      Breath sounds: Normal breath sounds.   Abdominal:      General: Bowel sounds are normal.   Musculoskeletal:         General: Normal range of motion.      Cervical back: Normal range of motion.   Skin:     General: Skin is PALE and dry.   Neurological:      General: No focal deficit present.      Mental Status: he is alert. Mental status is at baseline.   Psychiatric:         Mood and Affect: Mood normal.         Behavior: Behavior normal.          Laboratory Data  and Imaging Data    .labs  Hemoglobin   Date Value Ref Range Status   10/02/2024 8.9 (L) 13.0 - 16.0 g/dL Final     Hematocrit   Date Value Ref Range Status   10/02/2024 27.4 (L) 36.0 - 48.0 % Final     Platelets   Date Value Ref Range Status   10/02/2024 366 135 - 420 K/uL Final     Sodium   Date Value Ref Range Status   10/02/2024 139 136 - 145 mmol/L Final     Potassium   Date Value Ref Range Status   10/02/2024 3.4 (L) 3.5 - 5.5 mmol/L Final     Chloride   Date Value Ref Range Status   10/02/2024 108 100 - 111 mmol/L Final     CO2   Date Value Ref Range Status   10/02/2024 22 21 - 32 mmol/L Final     BUN   Date Value Ref Range Status   10/02/2024 43 (H) 7.0 - 18 MG/DL Final       55 min spent in patient care. Critically ill. Due to above . Also US doppler with ischemia to the kidney. Transfer recommended on checking patient left AMA and I was not notified called all numbers on chart no answer. Tried [facility] chart same numbers. Wanted to call for a wellness check address on chart po box . Will keep trying

## 2024-10-02 NOTE — ED PROVIDER NOTES
- 14.5 %    Platelets 366 135 - 420 K/uL    MPV 8.5 (L) 9.2 - 11.8 FL    Nucleated RBCs 0.0 0  WBC    nRBC 0.00 0.00 - 0.01 K/uL    Neutrophils % 58 40 - 73 %    Lymphocytes % 30 21 - 52 %    Monocytes % 8 3 - 10 %    Eosinophils % 3 0 - 5 %    Basophils % 0 0 - 2 %    Immature Granulocytes % 0 0.0 - 0.5 %    Neutrophils Absolute 4.9 1.8 - 8.0 K/UL    Lymphocytes Absolute 2.5 0.9 - 3.6 K/UL    Monocytes Absolute 0.7 0.05 - 1.2 K/UL    Eosinophils Absolute 0.2 0.0 - 0.4 K/UL    Basophils Absolute 0.0 0.0 - 0.1 K/UL    Immature Granulocytes Absolute 0.0 0.00 - 0.04 K/UL    Differential Type AUTOMATED     Comprehensive Metabolic Panel w/ Reflex to MG    Collection Time: 10/02/24  3:49 AM   Result Value Ref Range    Sodium 139 136 - 145 mmol/L    Potassium 3.4 (L) 3.5 - 5.5 mmol/L    Chloride 108 100 - 111 mmol/L    CO2 22 21 - 32 mmol/L    Anion Gap 9 3.0 - 18 mmol/L    Glucose 150 (H) 74 - 99 mg/dL    BUN 43 (H) 7.0 - 18 MG/DL    Creatinine 3.57 (H) 0.6 - 1.3 MG/DL    BUN/Creatinine Ratio 12 12 - 20      Est, Glom Filt Rate 18 (L) >60 ml/min/1.73m2    Calcium 9.5 8.5 - 10.1 MG/DL    Total Bilirubin 0.4 0.2 - 1.0 MG/DL    ALT 29 16 - 61 U/L    AST 26 10 - 38 U/L    Alk Phosphatase 91 45 - 117 U/L    Total Protein 7.1 6.4 - 8.2 g/dL    Albumin 2.9 (L) 3.4 - 5.0 g/dL    Globulin 4.2 (H) 2.0 - 4.0 g/dL    Albumin/Globulin Ratio 0.7 (L) 0.8 - 1.7     Troponin    Collection Time: 10/02/24  3:49 AM   Result Value Ref Range    Troponin, High Sensitivity 8 0 - 78 ng/L   Magnesium    Collection Time: 10/02/24  3:49 AM   Result Value Ref Range    Magnesium 1.6 1.6 - 2.6 mg/dL   EKG 12 Lead    Collection Time: 10/02/24  6:16 AM   Result Value Ref Range    Ventricular Rate 84 BPM    Atrial Rate 84 BPM    P-R Interval 166 ms    QRS Duration 88 ms    Q-T Interval 406 ms    QTc Calculation (Bazett) 479 ms    P Axis 58 degrees    R Axis 27 degrees    T Axis 57 degrees    Diagnosis       Normal sinus rhythm  Normal ECG  When

## 2024-10-02 NOTE — ED TRIAGE NOTES
Patient comes in by EMS. States that not even an hour before calling EMS, he wasn't feeling well and then his chest started feeling funny. Patient does not elaborate on how he wasn't feeling well but is somewhat clammy and unable to sit still.   Colchicine Counseling:  Patient counseled regarding adverse effects including but not limited to stomach upset (nausea, vomiting, stomach pain, or diarrhea).  Patient instructed to limit alcohol consumption while taking this medication.  Colchicine may reduce blood counts especially with prolonged use.  The patient understands that monitoring of kidney function and blood counts may be required, especially at baseline. The patient verbalized understanding of the proper use and possible adverse effects of colchicine.  All of the patient's questions and concerns were addressed.

## 2024-10-02 NOTE — CONSULTS
General Surgery Consult    Yohannes Sanchez  Admit date: 10/2/2024    MRN: [id number]     : 1960     Age: 64 y.o.        Attending Physician: Yunior Whitney MD, [facility]      History of Present Illness:      Yohannes Sanchez is a 64 y.o. male who I was consulted by the emergency room for evaluation of abdominal pain with nausea and vomiting.  The patient is here with his wife and he stated that he had a colectomy and a colostomy for diverticulitis 3 weeks ago at the [facility].  The patient stated that he had diverticulitis for 3 or 4 years and they did his surgery and he was supposed to have a primary anastomosis it seems but because there was severe infection he ended up with a colostomy bag.  He was doing well and because he lives in [place] he moved here to the area and yesterday started having abdominal pain with chest pain and he started having nausea and vomiting as well.  He was very dehydrated and he had acute renal injury when he presented here and his CT scan also showed a new thrombus of the aorta it seems and the CT scan of abdomen and pelvis also showed picture of ileus versus early small bowel obstruction.  The patient stated that his colostomy is functioning .    Patient Active Problem List    Diagnosis Date Noted    GAYLA (acute kidney injury) (HCC) 10/02/2024    Diverticulitis 2014     Past Medical History:   Diagnosis Date    Diabetes (HCC)     Borderline-no meds.    Gastrointestinal disorder     Other ill-defined conditions(799.89)     Hx of Stomach ulcer?    Psychiatric disorder     Anxiety, depression    Seizures (HCC)     Last episode was       Past Surgical History:   Procedure Laterality Date    COLONOSCOPY        Social History     Tobacco Use    Smoking status: Every Day     Current packs/day: 1.00     Types: Cigarettes    Smokeless tobacco: Not on file   Substance Use Topics    Alcohol use: No      Social History     Tobacco Use   Smoking Status Every Day    Current

## 2024-10-02 NOTE — ED PROVIDER NOTES
during which I was engaged in work directly related to the patient's care as described above, whether I was at bedside or elsewhere in the Emergency Department. It did not include time spent performing other reported procedures or the services of residents, students, or nurses.    Nile Pascal DO    1:52 AM       Nile Pascal Jr., DO  10/03/24 1021

## 2024-10-03 LAB
C3 SERPL-MCNC: 182 MG/DL (ref 82–167)
C4 SERPL-MCNC: 43 MG/DL (ref 12–38)